# Patient Record
Sex: FEMALE | Race: WHITE | Employment: PART TIME | URBAN - NONMETROPOLITAN AREA
[De-identification: names, ages, dates, MRNs, and addresses within clinical notes are randomized per-mention and may not be internally consistent; named-entity substitution may affect disease eponyms.]

---

## 2018-10-31 ENCOUNTER — OFFICE VISIT (OUTPATIENT)
Dept: FAMILY MEDICINE CLINIC | Age: 33
End: 2018-10-31
Payer: MEDICARE

## 2018-10-31 VITALS
TEMPERATURE: 99.1 F | OXYGEN SATURATION: 97 % | SYSTOLIC BLOOD PRESSURE: 112 MMHG | DIASTOLIC BLOOD PRESSURE: 60 MMHG | WEIGHT: 179 LBS | HEART RATE: 78 BPM | RESPIRATION RATE: 16 BRPM

## 2018-10-31 DIAGNOSIS — J20.9 ACUTE WHEEZY BRONCHITIS: Primary | ICD-10-CM

## 2018-10-31 PROCEDURE — G8484 FLU IMMUNIZE NO ADMIN: HCPCS | Performed by: NURSE PRACTITIONER

## 2018-10-31 PROCEDURE — 99202 OFFICE O/P NEW SF 15 MIN: CPT | Performed by: NURSE PRACTITIONER

## 2018-10-31 PROCEDURE — G8427 DOCREV CUR MEDS BY ELIG CLIN: HCPCS | Performed by: NURSE PRACTITIONER

## 2018-10-31 PROCEDURE — G8421 BMI NOT CALCULATED: HCPCS | Performed by: NURSE PRACTITIONER

## 2018-10-31 PROCEDURE — 1036F TOBACCO NON-USER: CPT | Performed by: NURSE PRACTITIONER

## 2018-10-31 RX ORDER — AZITHROMYCIN 250 MG/1
TABLET, FILM COATED ORAL
Qty: 1 PACKET | Refills: 0 | Status: SHIPPED | OUTPATIENT
Start: 2018-10-31 | End: 2018-11-04

## 2018-10-31 RX ORDER — ALBUTEROL SULFATE 90 UG/1
2 AEROSOL, METERED RESPIRATORY (INHALATION) EVERY 4 HOURS PRN
Qty: 1 INHALER | Refills: 0 | Status: SHIPPED | OUTPATIENT
Start: 2018-10-31 | End: 2020-10-27 | Stop reason: ALTCHOICE

## 2018-10-31 RX ORDER — PREDNISONE 10 MG/1
30 TABLET ORAL DAILY
Qty: 15 TABLET | Refills: 0 | Status: SHIPPED | OUTPATIENT
Start: 2018-10-31 | End: 2018-11-05

## 2018-10-31 ASSESSMENT — ENCOUNTER SYMPTOMS
RHINORRHEA: 1
WHEEZING: 1
SHORTNESS OF BREATH: 0
COUGH: 1
HEMOPTYSIS: 0

## 2018-10-31 NOTE — PROGRESS NOTES
Family History   Problem Relation Age of Onset   [de-[facility] Mother         stillborn x1 term    Heart Disease Father     Cancer Father         stomach    High Blood Pressure Father     Mental Illness Maternal Grandmother     Cancer Paternal Grandmother         stomach, pancreas, breast    High Blood Pressure Paternal Grandmother     Diabetes Paternal Grandmother      Social History   Substance Use Topics    Smoking status: Former Smoker     Years: 5.00    Smokeless tobacco: Never Used    Alcohol use No      Current Outpatient Prescriptions   Medication Sig Dispense Refill    azithromycin (ZITHROMAX Z-MILVIA) 250 MG tablet Take 2 tablets (500 mg) on Day 1, and then take 1 tablet (250 mg) on days 2 through 5. 1 packet 0    predniSONE (DELTASONE) 10 MG tablet Take 3 tablets by mouth daily for 5 days 15 tablet 0    ipratropium (ATROVENT HFA) 17 MCG/ACT inhaler Inhale 1 puff into the lungs 3 times daily 1 Inhaler 3    albuterol sulfate HFA (PROVENTIL HFA) 108 (90 Base) MCG/ACT inhaler Inhale 2 puffs into the lungs every 4 hours as needed for Wheezing or Shortness of Breath (COUGH) 1 Inhaler 0     No current facility-administered medications for this visit. Allergies   Allergen Reactions    Latex          Subjective:      Review of Systems   Constitutional: Positive for fever (101.7). Negative for chills. HENT: Positive for postnasal drip and rhinorrhea. Negative for ear pain. Respiratory: Positive for cough and wheezing. Negative for hemoptysis and shortness of breath. Cardiovascular: Positive for chest pain. Objective:     /60 (Site: Right Upper Arm, Position: Sitting, Cuff Size: Medium Adult)   Pulse 78   Temp 99.1 °F (37.3 °C) (Tympanic)   Resp 16   Wt 179 lb (81.2 kg)   SpO2 97%   Breastfeeding? Unknown     Physical Exam   Constitutional: She is oriented to person, place, and time. Vital signs are normal. She appears well-developed and well-nourished. if:    · You have severe trouble breathing.    Call your doctor now or seek immediate medical care if:    · You have new or worse trouble breathing.     · You cough up dark brown or bloody mucus (sputum).     · You have a new or higher fever.     · You have a new rash.    Watch closely for changes in your health, and be sure to contact your doctor if:    · You cough more deeply or more often, especially if you notice more mucus or a change in the color of your mucus.     · You are not getting better as expected. Where can you learn more? Go to https://[employer]Footbalistic. org and sign in to your [employer] account. Enter H333 in the LightPole box to learn more about \"Bronchitis: Care Instructions. \"     If you do not have an account, please click on the \"Sign Up Now\" link. Current as of: December 6, 2017  Content Version: 11.7  © 6967-3534 [employer]. Care instructions adapted under license by [facility] ([facility]). If you have questions about a medical condition or this instruction, always ask your healthcare professional. [place] [address] any warranty or liability for your use of this information. Discussed use, benefit, and side effects of prescribed medications. All patient, parent, or caregiver questions answered. Patient/parent/caregiver voiced understanding. Reviewed health maintenance. Instructed to continuecurrent medications, diet and exercise. Patient agreed with treatment plan. Follow up as directed.            Electronically signed by FLORIN Lopez CNP on10/31/2018

## 2019-06-27 ENCOUNTER — OFFICE VISIT (OUTPATIENT)
Dept: FAMILY MEDICINE CLINIC | Age: 34
End: 2019-06-27
Payer: MEDICARE

## 2019-06-27 VITALS
HEART RATE: 74 BPM | WEIGHT: 185 LBS | DIASTOLIC BLOOD PRESSURE: 76 MMHG | OXYGEN SATURATION: 99 % | TEMPERATURE: 97.6 F | SYSTOLIC BLOOD PRESSURE: 124 MMHG

## 2019-06-27 DIAGNOSIS — R22.0 SWELLING OF LEFT SIDE OF FACE: Primary | ICD-10-CM

## 2019-06-27 PROCEDURE — G8421 BMI NOT CALCULATED: HCPCS | Performed by: NURSE PRACTITIONER

## 2019-06-27 PROCEDURE — G8427 DOCREV CUR MEDS BY ELIG CLIN: HCPCS | Performed by: NURSE PRACTITIONER

## 2019-06-27 PROCEDURE — 99213 OFFICE O/P EST LOW 20 MIN: CPT | Performed by: NURSE PRACTITIONER

## 2019-06-27 PROCEDURE — 1036F TOBACCO NON-USER: CPT | Performed by: NURSE PRACTITIONER

## 2019-06-27 ASSESSMENT — ENCOUNTER SYMPTOMS
EYE PAIN: 1
VOMITING: 1
SINUS PAIN: 1
NAUSEA: 1
COUGH: 0
FACIAL SWELLING: 1
SINUS PRESSURE: 1

## 2019-06-27 NOTE — PROGRESS NOTES
[facility] [place] [address] [address] [facility], APRN-CNP  [address]  Phone:  [phone]  Fax:  [phone]  Otilio Beatty is a 36 y.o. female who presents today for her medical conditions/complaints as noted below. Otilio Beatty c/o of Facial Pain (swollen painful area on left cheek - started about 3 days ago, continues to get bigger, decreased hearing right ear)      HPI:     Other   This is a new problem. The current episode started in the past 7 days (2 days). The problem occurs constantly. The problem has been rapidly worsening. Associated symptoms include fatigue, headaches, nausea, a rash and vomiting. Pertinent negatives include no chills, coughing or fever. Associated symptoms comments: Facial pain, Pain with extraocular movements of the left eye. Blurred vision of the left eye. . Exacerbated by: moving the left eye. She has tried acetaminophen and NSAIDs for the symptoms. The treatment provided no relief.        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 2013    Unspecified diseases of blood and blood-forming organs       Past Surgical History:   Procedure Laterality Date     SECTION N/A '08,'    HERNIA REPAIR       Family History   Problem Relation Age of Onset    Miscarriages / Stillbirths Mother         stillborn x1 term    Heart Disease Father     Cancer Father         stomach    High Blood Pressure Father     Mental Illness Maternal Grandmother     Cancer Paternal Grandmother         stomach, pancreas, breast    High Blood Pressure Paternal Grandmother     Diabetes Paternal Grandmother      Social History     Tobacco Use    Smoking status: Former Smoker     Years: 5.00    Smokeless tobacco: Never Used   Substance Use Topics    Alcohol use: No      Current Outpatient Medications   Medication Sig Dispense Refill    ipratropium (ATROVENT HFA) 17 MCG/ACT inhaler Inhale 1 puff into the lungs 3 times daily 1 Inhaler 3    albuterol sulfate HFA (PROVENTIL HFA) 108 (90 Base) MCG/ACT inhaler Inhale 2 puffs into the lungs every 4 hours as needed for Wheezing or Shortness of Breath (COUGH) 1 Inhaler 0     No current facility-administered medications for this visit. Allergies   Allergen Reactions    Latex        No exam data present    Subjective:      Review of Systems   Constitutional: Positive for fatigue. Negative for chills and fever. HENT: Positive for dental problem, ear pain, facial swelling, hearing loss, sinus pressure and sinus pain. Eyes: Positive for pain. Respiratory: Negative for cough. Gastrointestinal: Positive for nausea and vomiting. Skin: Positive for rash. Neurological: Positive for headaches. OU 20/50  Right 20/40  Left 20/50  Objective:     /76 (Site: Left Upper Arm, Position: Sitting, Cuff Size: Large Adult)   Pulse 74   Temp 97.6 °F (36.4 °C) (Tympanic)   Wt 185 lb (83.9 kg)   SpO2 99%     Physical Exam   Constitutional: She appears well-developed and well-nourished. HENT:   Head: Normocephalic. Mouth/Throat: Oropharynx is clear and moist. Dental abscesses present. No tonsillar exudate. Eyes: Right eye exhibits no discharge. Left eye exhibits no discharge. Pain with extraocular movement. Neck: Normal range of motion. Neck supple. Pulmonary/Chest: Effort normal.   Lymphadenopathy:     She has no cervical adenopathy. Skin: Skin is warm and dry.

## 2019-06-27 NOTE — PATIENT INSTRUCTIONS
Go to the hospital for further evaluation and treatment. May require CT and IV antibiotics with drainage. Patient Education        Headache: Care Instructions  Your Care Instructions    Headaches have many possible causes. Most headaches aren't a sign of a more serious problem, and they will get better on their own. Home treatment may help you feel better faster. The doctor has checked you carefully, but problems can develop later. If you notice any problems or new symptoms, get medical treatment right away. Follow-up care is a key part of your treatment and safety. Be sure to make and go to all appointments, and call your doctor if you are having problems. It's also a good idea to know your test results and keep a list of the medicines you take. How can you care for yourself at home? · Do not drive if you have taken a prescription pain medicine. · Rest in a quiet, dark room until your headache is gone. Close your eyes and try to relax or go to sleep. Don't watch TV or read. · Put a cold, moist cloth or cold pack on the painful area for 10 to 20 minutes at a time. Put a thin cloth between the cold pack and your skin. · Use a warm, moist towel or a heating pad set on low to relax tight shoulder and neck muscles. · Have someone gently massage your neck and shoulders. · Take pain medicines exactly as directed. ? If the doctor gave you a prescription medicine for pain, take it as prescribed. ? If you are not taking a prescription pain medicine, ask your doctor if you can take an over-the-counter medicine. · Be careful not to take pain medicine more often than the instructions allow, because you may get worse or more frequent headaches when the medicine wears off. · Do not ignore new symptoms that occur with a headache, such as a fever, weakness or numbness, vision changes, or confusion. These may be signs of a more serious problem.   To prevent headaches  · Keep a headache diary so you can figure out what triggers your headaches. Avoiding triggers may help you prevent headaches. Record when each headache began, how long it lasted, and what the pain was like (throbbing, aching, stabbing, or dull). Write down any other symptoms you had with the headache, such as nausea, flashing lights or dark spots, or sensitivity to bright light or loud noise. Note if the headache occurred near your period. List anything that might have triggered the headache, such as certain foods (chocolate, cheese, wine) or odors, smoke, bright light, stress, or lack of sleep. · Find healthy ways to deal with stress. Headaches are most common during or right after stressful times. Take time to relax before and after you do something that has caused a headache in the past.  · Try to keep your muscles relaxed by keeping good posture. Check your jaw, face, neck, and shoulder muscles for tension, and try relaxing them. When sitting at a desk, change positions often, and stretch for 30 seconds each hour. · Get plenty of sleep and exercise. · Eat regularly and well. Long periods without food can trigger a headache. · Treat yourself to a massage. Some people find that regular massages are very helpful in relieving tension. · Limit caffeine by not drinking too much coffee, tea, or soda. But don't quit caffeine suddenly, because that can also give you headaches. · Reduce eyestrain from computers by blinking frequently and looking away from the computer screen every so often. Make sure you have proper eyewear and that your monitor is set up properly, about an arm's length away. · Seek help if you have depression or anxiety. Your headaches may be linked to these conditions. Treatment can both prevent headaches and help with symptoms of anxiety or depression. When should you call for help? Call 911 anytime you think you may need emergency care. For example, call if:    · You have signs of a stroke.  These may include:  ? Sudden numbness,

## 2020-01-21 ENCOUNTER — HOSPITAL ENCOUNTER (OUTPATIENT)
Age: 35
Setting detail: SPECIMEN
Discharge: HOME | End: 2020-01-21
Payer: MEDICARE

## 2020-01-28 LAB — SURGICAL PATHOLOGY REPORT: NORMAL

## 2020-10-27 ENCOUNTER — VIRTUAL VISIT (OUTPATIENT)
Dept: FAMILY MEDICINE CLINIC | Age: 35
End: 2020-10-27
Payer: MEDICARE

## 2020-10-27 PROCEDURE — G8431 POS CLIN DEPRES SCRN F/U DOC: HCPCS | Performed by: INTERNAL MEDICINE

## 2020-10-27 PROCEDURE — 99214 OFFICE O/P EST MOD 30 MIN: CPT | Performed by: INTERNAL MEDICINE

## 2020-10-27 ASSESSMENT — PATIENT HEALTH QUESTIONNAIRE - PHQ9
3. TROUBLE FALLING OR STAYING ASLEEP: 3
4. FEELING TIRED OR HAVING LITTLE ENERGY: 3
8. MOVING OR SPEAKING SO SLOWLY THAT OTHER PEOPLE COULD HAVE NOTICED. OR THE OPPOSITE, BEING SO FIGETY OR RESTLESS THAT YOU HAVE BEEN MOVING AROUND A LOT MORE THAN USUAL: 0
SUM OF ALL RESPONSES TO PHQ QUESTIONS 1-9: 19
7. TROUBLE CONCENTRATING ON THINGS, SUCH AS READING THE NEWSPAPER OR WATCHING TELEVISION: 3
6. FEELING BAD ABOUT YOURSELF - OR THAT YOU ARE A FAILURE OR HAVE LET YOURSELF OR YOUR FAMILY DOWN: 1
SUM OF ALL RESPONSES TO PHQ QUESTIONS 1-9: 19
SUM OF ALL RESPONSES TO PHQ9 QUESTIONS 1 & 2: 6
10. IF YOU CHECKED OFF ANY PROBLEMS, HOW DIFFICULT HAVE THESE PROBLEMS MADE IT FOR YOU TO DO YOUR WORK, TAKE CARE OF THINGS AT HOME, OR GET ALONG WITH OTHER PEOPLE: 2
9. THOUGHTS THAT YOU WOULD BE BETTER OFF DEAD, OR OF HURTING YOURSELF: 0
2. FEELING DOWN, DEPRESSED OR HOPELESS: 3
SUM OF ALL RESPONSES TO PHQ QUESTIONS 1-9: 19
1. LITTLE INTEREST OR PLEASURE IN DOING THINGS: 3
5. POOR APPETITE OR OVEREATING: 3

## 2020-10-27 NOTE — PROGRESS NOTES
[address]  [address]  Dept: [phone]  Dept Fax: [phone]: [phone]     Visit Date:  10/27/2020    Patient:  Fartun Terrazas  YOB: 1985    HPI:   Fartun Terrazas presents today for   Chief Complaint   Patient presents with   [employer] Established New Doctor    Weight Loss     patient has lost around 50 lbs in the last month. patient is having an issue with not being able to eat, she is getting nauseous.  Depression     patient would like to discuss depression issues. .    AUDIO/VIDEO CALL DUE TO COVID 23   HPI 64-year-old female is calling regarding unintentional weight loss in the past 1 month with dyspepsia-like symptoms. Patient reports in the past 3 to 4 weeks she has lost more than 50 pounds and currently weighs 142 pounds. She has decreased appetite the only thing she is able to keep down his water but milk pop soda is anything makes her stomach symptoms worsen she feels like puking all over. Last year she reports she had a diagnosis of H. pylori and did receive the treatment but there was no follow-up test that was done for eradication testing. SHe works in a nursing home and last time she was also diagnosed with C. difficile. work-up last year with endoscopy and colonoscopy and that is when they discovered H. pylori and she reports partially taking the treatments for less than 14 days as those antibiotics were making her sicker. She reports she was never tested again for eradication of H. pylori. She was also scheduled for a swallow study/possible gastroparesis study or barium swallow around February March that got canceled because of COVID-19 related pandemic. Report having a family history where her dad got diagnosed at a very young age of stomach cancer.   Also does marijuana on intermittent basis 3 times per week as that is the only thing that has been helping her to keep food down and improve appetite. Does report having belching bloating burping and feeling excessively hot and cold sometimes with a sensation of as if she swallowed a mint. She has never had a normal normal bowel movements in September it is always more watery not firm more creamy. Feels like her stomach is upset and she is starving to death. She does report having periodic bloody stools and black stools and does report having history of hemorrhoids. She denies overuse of NSAIDs. RePorts having night sweats almost feels like she is having hot flashes as if she is going through menopause but denies any skipping of her periods. Denies Any lumps or bumps around her body. Having history of colonic polyps that was removed. Feel lightheaded sometimes. From the depression screen and she scored high close to 19 not on meds and not homicidal or suicidal. Smoking history 1 ppd lasting x 1 week since 19 years and no alcohol. Medications  Prior to Visit Medications    Not on File        Allergies:  is allergic to latex and iodides. Past Medical History:   has a past medical history of History of  section, MTHFR deficiency complicating pregnancy ([facility] [address] Pregnancy complicated by previous recurrent miscarriages, and Unspecified diseases of blood and blood-forming organs. Past Surgical History   has a past surgical history that includes hernia repair and  section (N/A, ,). Family History  family history includes Cancer in her father and paternal grandmother; Diabetes in her paternal grandmother; Heart Disease in her father; High Blood Pressure in her father and paternal grandmother; Mental Illness in her maternal grandmother; [place] / [place] in her mother. Social History   reports that she has quit smoking. She quit after 5.00 years of use.  She has never used smokeless tobacco. She reports that she does not drink alcohol or use drugs.    Health Maintenance:    Health Maintenance   Topic Date Due    Varicella vaccine (1 of 2 - 2-dose childhood series) 01/09/1987    HIV screen  01/09/2001    DTaP/Tdap/Td vaccine (1 - Tdap) 01/09/2005    Cervical cancer screen  01/09/2007    Flu vaccine (1) 09/01/2020    Hepatitis A vaccine  Aged Out    Hepatitis B vaccine  Aged Out    Hib vaccine  Aged Out    Meningococcal (ACWY) vaccine  Aged Out    Pneumococcal 0-64 years Vaccine  Aged Out       Subjective:      Review of Systems   Constitutional: Positive for fatigue and unexpected weight change. Negative for diaphoresis and fever. Lost>50 lbs within a month. HENT: Negative for ear pain, postnasal drip, rhinorrhea, sinus pain, sore throat and trouble swallowing. Eyes: Negative for visual disturbance. Respiratory: Negative for cough, chest tightness and shortness of breath. Cardiovascular: Negative for chest pain and leg swelling. Gastrointestinal: Positive for abdominal pain, diarrhea, nausea and vomiting. Negative for abdominal distention, anal bleeding, blood in stool, constipation and rectal pain. Endocrine: Negative for polyuria. Genitourinary: Negative for difficulty urinating and flank pain. Musculoskeletal: Negative for arthralgias, joint swelling and myalgias. Skin: Negative for rash. Allergic/Immunologic: Negative for environmental allergies. Neurological: Negative for weakness, light-headedness, numbness and headaches. Hematological: Negative for adenopathy. Psychiatric/Behavioral: Negative for behavioral problems and suicidal ideas. The patient is not nervous/anxious. Objective: There were no vitals taken for this visit. Physical Exam        Assessment       Diagnosis Orders   1. History of Clostridium difficile colitis  H. Pylori Breath Test    Dinora Fermin DO, General Surgery, [place]   2.  Helicobacter pylori (H. pylori) infection  H. Pylori Breath Test    [facility], DO, General Surgery, [place]   3. Dyspepsia  H. Pylori Breath Test    [facility], DO, General Surgery, [place]   4. Unintended weight loss  H. Pylori Breath Test    [facility], DO, General Surgery, [place]   5. Nausea and vomiting, intractability of vomiting not specified, unspecified vomiting type  H. Pylori Breath Test    [facility], DO, General Surgery, [place]   6. Marijuana user  [facility] [place], DO, General Surgery, [place]   7. Hemorrhoids, unspecified hemorrhoid type  H. Pylori Breath Test    [facility], DO, General Surgery, [place]   8. Decreased appetite  H. Pylori Breath Test    [facility], DO, General Surgery, [place]   9. Depression, unspecified depression type  H. Pylori Breath Test    [facility], DO, General Surgery, [place]   10. Smoking  H. Pylori Breath Test    [facility], DO, General Surgery, [place]   11. Positive depression screening  Positive Screen for Clinical Depression with a Documented Follow-up Plan          PLAN       Minimize Use of marijuana and smoking as much as possible. patient is not interested in any medicines for depression as of yet. Use of Tums Pepto-Bismol as well as avoiding any NSAIDs and plenty of water Gatorade Pedialyte to keep yourself hydrated would be the key here. Hold off on PPI for now,  order H. pylori urea breath test and also referral for endoscopy from general surgery to rule out any form of H. pylori/H. pylori related complications such as gastric cancer. Taylor Cottrell is a 30 y.o. female being evaluated by a Virtual Visit (video visit) encounter to address concerns as mentioned above. A caregiver was present when appropriate. Due to this being a TeleHealth encounter (During [place] [address] public health emergency), evaluation of the following organ systems was limited: Vitals/Constitutional/EENT/Resp/CV/GI//MS/Neuro/Skin/Heme-Lymph-Imm.   Pursuant to the emergency declaration under the [address], [address] [place] [address] authority and the [employer] Act, this Virtual Visit was conducted with patient's (and/or legal guardian's) consent, to reduce the patient's risk of exposure to COVID-19 and provide necessary medical care. The patient (and/or legal guardian) has also been advised to contact this office for worsening conditions or problems, and seek emergency medical treatment and/or call 911 if deemed necessary. Patient identification was verified at the start of the visit: Yes    Total time spent for this encounter: 30 mins    Services were provided through a video synchronous discussion virtually to substitute for in-person clinic visit. Patient and provider were located at their individual homes. --Star Ayala MD on 11/3/2020 at 12:01 PM    An electronic signature was used to authenticate this note. Orders Placed This Encounter   Procedures    H. Pylori Breath Test     History of H. Pylori in the past.     Standing Status:   Future     Standing Expiration Date:   10/27/2021   Jessica Roberts DO, General Surgery, [place]     Referral Priority:   Routine     Referral Type:   Eval and Treat     Referral Reason:   Specialty Services Required     Referred to Provider:   Paris Macdonald DO     Requested Specialty:   General Surgery     Number of Visits Requested:   1    Positive Screen for Clinical Depression with a Documented Follow-up Plan      No follow-ups on file. Patient given educational materials - see patient instructions. Discussed use, benefit, and side effects of prescribed medications. All patient questions answered. Pt voiced understanding. Reviewed health maintenance.        Electronically signed Sasha Delgado MD on 10/27/2020 at 1:19 PM EDT

## 2020-10-30 ASSESSMENT — ENCOUNTER SYMPTOMS
BLOOD IN STOOL: 0
RHINORRHEA: 0
CONSTIPATION: 0
ABDOMINAL DISTENTION: 0
ABDOMINAL PAIN: 1
RECTAL PAIN: 0
SHORTNESS OF BREATH: 0
CHEST TIGHTNESS: 0
COUGH: 0
TROUBLE SWALLOWING: 0
VOMITING: 1
NAUSEA: 1
SINUS PAIN: 0
ANAL BLEEDING: 0
SORE THROAT: 0
DIARRHEA: 1

## 2020-11-12 ENCOUNTER — TELEPHONE (OUTPATIENT)
Dept: SURGERY | Age: 35
End: 2020-11-12

## 2020-11-12 NOTE — TELEPHONE ENCOUNTER
Patient was referred to General Surgery from PCP Dr. Tye Marie. Patient is requesting to be scheduled here at the [facility]. Dr. Cesar Collado notified of patient's symptoms. Patient states she has lost 50 pounds in a matter of a month, nausea, vomiting, constipation/diarrhea, as well as a previous history of Positive H.Pylor. Dr. Cesar Collado request to have patient scheduled tomorrow in clinic. Dr. Cesar Collado ordered CMP and CBC for patient at this time.

## 2020-11-13 ENCOUNTER — OFFICE VISIT (OUTPATIENT)
Dept: SURGERY | Age: 35
End: 2020-11-13
Payer: MEDICARE

## 2020-11-13 VITALS
DIASTOLIC BLOOD PRESSURE: 70 MMHG | HEART RATE: 66 BPM | TEMPERATURE: 98.5 F | HEIGHT: 68 IN | WEIGHT: 151.4 LBS | BODY MASS INDEX: 22.94 KG/M2 | SYSTOLIC BLOOD PRESSURE: 121 MMHG

## 2020-11-13 LAB
ALBUMIN/GLOBULIN RATIO: 1.5 G/DL
ALBUMIN: 4.1 G/DL (ref 3.5–5)
ALP BLD-CCNC: 73 UNITS/L (ref 38–126)
ALT SERPL-CCNC: 9 UNITS/L (ref 4–35)
ANION GAP SERPL CALCULATED.3IONS-SCNC: 10 MMOL/L
AST SERPL-CCNC: 20 UNITS/L (ref 14–36)
BILIRUB SERPL-MCNC: 0.6 MG/DL (ref 0.2–1.3)
BUN BLDV-MCNC: 12 MG/DL (ref 7–17)
CALCIUM SERPL-MCNC: 9.2 MG/DL (ref 8.4–10.2)
CHLORIDE BLD-SCNC: 103 MMOL/L (ref 98–120)
CO2: 27 MMOL/L (ref 22–31)
CREAT SERPL-MCNC: 0.7 MG/DL (ref 0.5–1)
GFR CALCULATED: > 60
GLOBULIN: 2.8 G/DL
GLUCOSE: 105 MG/DL (ref 65–105)
HCT VFR BLD CALC: 49 % (ref 37–47)
HEMOGLOBIN: 15.8 (ref 12–16)
MCH RBC QN AUTO: 28.6 PG (ref 28.5–32.5)
MCHC RBC AUTO-ENTMCNC: 32.2 G/DL (ref 32–37)
MCV RBC AUTO: 88.7 FL (ref 80–94)
PDW BLD-RTO: 12.1 % (ref 8.5–15.5)
PLATELET # BLD: 216.4 THOU/MM3 (ref 130–400)
POTASSIUM SERPL-SCNC: 4.3 MMOL/L (ref 3.6–5)
RBC: 5.52 M/UL (ref 4.2–5.4)
SODIUM BLD-SCNC: 139 MMOL/L (ref 135–145)
TOTAL PROTEIN, SERUM: 7 G/DL (ref 6.3–8.2)
WBC: 8.9 THOU/ML3 (ref 4.8–10.8)

## 2020-11-13 PROCEDURE — G8484 FLU IMMUNIZE NO ADMIN: HCPCS | Performed by: SURGERY

## 2020-11-13 PROCEDURE — G8420 CALC BMI NORM PARAMETERS: HCPCS | Performed by: SURGERY

## 2020-11-13 PROCEDURE — 4004F PT TOBACCO SCREEN RCVD TLK: CPT | Performed by: SURGERY

## 2020-11-13 PROCEDURE — 99204 OFFICE O/P NEW MOD 45 MIN: CPT | Performed by: SURGERY

## 2020-11-13 PROCEDURE — G8427 DOCREV CUR MEDS BY ELIG CLIN: HCPCS | Performed by: SURGERY

## 2020-11-13 RX ORDER — BISACODYL 5 MG
TABLET, DELAYED RELEASE (ENTERIC COATED) ORAL
Qty: 2 TABLET | Refills: 0 | Status: SHIPPED | OUTPATIENT
Start: 2020-11-13 | End: 2020-12-03

## 2020-11-13 RX ORDER — OMEPRAZOLE 40 MG/1
40 CAPSULE, DELAYED RELEASE ORAL
Qty: 30 CAPSULE | Refills: 5 | Status: SHIPPED | OUTPATIENT
Start: 2020-11-13 | End: 2020-12-03

## 2020-11-13 ASSESSMENT — ENCOUNTER SYMPTOMS
ANAL BLEEDING: 1
NAUSEA: 1
EYES NEGATIVE: 1
DIARRHEA: 1
VOICE CHANGE: 1
BLOOD IN STOOL: 1
SHORTNESS OF BREATH: 0
WHEEZING: 0
CHEST TIGHTNESS: 0
CONSTIPATION: 1
ABDOMINAL DISTENTION: 1
SORE THROAT: 0
TROUBLE SWALLOWING: 0
CHOKING: 0
VOMITING: 1
COUGH: 0
BACK PAIN: 0
ABDOMINAL PAIN: 1

## 2020-11-13 ASSESSMENT — CROHNS DISEASE ACTIVITY INDEX (CDAI): CDAI SCORE: 0

## 2020-11-13 NOTE — PROGRESS NOTES
Subjective:      Patient ID: Kaye Singh is a [address] y.o. female. Diarrhea    This is a chronic problem. The current episode started more than 1 year ago. The problem occurs less than 2 times per day. Diarrhea characteristics: very soft. The patient states that diarrhea awakens her from sleep. Associated symptoms include abdominal pain, arthralgias, headaches, vomiting and weight loss ( 50 lbs). Pertinent negatives include no coughing, fever or myalgias. Risk factors: had c. diff. about a year ago. She has tried nothing for the symptoms. There is no history of bowel resection, inflammatory bowel disease, irritable bowel syndrome, malabsorption, a recent abdominal surgery or short gut syndrome. Nausea & Vomiting   Associated symptoms include abdominal pain, anorexia, arthralgias, headaches, nausea and vomiting. Pertinent negatives include no chest pain, coughing, fever, joint swelling, myalgias, neck pain, rash, sore throat or weakness. Abdominal Pain   This is a chronic problem. The current episode started more than 1 year ago. The problem occurs constantly. The problem has been gradually worsening. The pain is located in the epigastric region. The pain is at a severity of 7/10. The quality of the pain is aching. Associated symptoms include anorexia, arthralgias, constipation, diarrhea, headaches, nausea, vomiting and weight loss ( 50 lbs). Pertinent negatives include no dysuria, fever or myalgias. The pain is aggravated by eating. The pain is relieved by recumbency. Treatments tried: Not currently on anything. Prior diagnostic workup includes upper endoscopy. Her past medical history is significant for abdominal surgery. There is no history of Crohn's disease, gallstones, GERD, irritable bowel syndrome, pancreatitis, PUD or ulcerative colitis.      Past Medical History:   Diagnosis Date    History of  section 2013    [facility]    homozygous    Pregnancy complicated by previous recurrent miscarriages 2013    Unspecified diseases of blood and blood-forming organs      Past Surgical History:   Procedure Laterality Date     SECTION N/A ,    HERNIA REPAIR       No current outpatient medications on file. No current facility-administered medications for this visit. Allergies   Allergen Reactions    Latex     Iodides      Social History     Tobacco Use    Smoking status: Light Tobacco Smoker     Years: 5.00     Types: Cigarettes    Smokeless tobacco: Never Used   Substance Use Topics    Alcohol use: No    Drug use: Yes     Types: Marijuana     Comment: Occasional to try and stop her nausea and eat. Family History   Problem Relation Age of Onset   [de-identified] / [place] Mother         stillborn x1 term    Heart Disease Father     Cancer Father         stomach    High Blood Pressure Father     Mental Illness Maternal Grandmother     Cancer Paternal Grandmother         stomach, pancreas, breast    High Blood Pressure Paternal Grandmother     Diabetes Paternal Grandmother      Review of Systems   Constitutional: Positive for unexpected weight change (   weight loss over past month perhaps 50 lbs) and weight loss ( 50 lbs). Negative for fever. HENT: Positive for voice change. Negative for dental problem, drooling, sore throat and trouble swallowing. Eyes: Negative. Respiratory: Negative for cough, choking, chest tightness, shortness of breath and wheezing. Cardiovascular: Negative for chest pain. Gastrointestinal: Positive for abdominal distention, abdominal pain, anal bleeding, anorexia, blood in stool, constipation, diarrhea, nausea and vomiting. Complains of prolapsing hemorrhoids requiring reduction. Endocrine: Positive for polydipsia. Negative for polyphagia and polyuria. Genitourinary: Positive for menstrual problem ( chronic heavy periods).  Negative for difficulty urinating, dysuria, pelvic pain, vaginal bleeding, vaginal discharge and vaginal pain. Musculoskeletal: Positive for arthralgias. Negative for back pain, joint swelling, myalgias, neck pain and neck stiffness. Skin: Negative for rash and wound. Neurological: Positive for dizziness, syncope, light-headedness and headaches. Negative for tremors, seizures, speech difficulty and weakness. Hematological: Bruises/bleeds easily. Psychiatric/Behavioral: Negative for agitation, behavioral problems, confusion, decreased concentration and dysphoric mood. /70 (Site: Left Upper Arm, Position: Sitting, Cuff Size: Medium Adult)   Pulse 66   Temp 98.5 °F (36.9 °C) (Temporal)   Ht 5' 8\" (1.727 m)   Wt 151 lb 6.4 oz (68.7 kg)   LMP 10/30/2020 (Within Days)   Breastfeeding No   BMI 23.02 kg/m²     Objective:   Physical Exam  Constitutional:       General: She is not in acute distress. Appearance: Normal appearance. She is normal weight. She is not ill-appearing, toxic-appearing or diaphoretic. HENT:      Head: Normocephalic and atraumatic. Mouth/Throat:      Mouth: Mucous membranes are moist.      Pharynx: Oropharynx is clear. No oropharyngeal exudate. Eyes:      General: No scleral icterus. Extraocular Movements: Extraocular movements intact. Conjunctiva/sclera: Conjunctivae normal.      Pupils: Pupils are equal, round, and reactive to light. Neck:      Musculoskeletal: Normal range of motion and neck supple. No neck rigidity or muscular tenderness. Cardiovascular:      Rate and Rhythm: Normal rate and regular rhythm. Pulses: Normal pulses. Heart sounds: Normal heart sounds. Pulmonary:      Effort: Pulmonary effort is normal. No respiratory distress. Breath sounds: Normal breath sounds. No wheezing. Abdominal:      General: Abdomen is flat. Bowel sounds are normal. There is no distension. Palpations: Abdomen is soft. There is no hepatomegaly, splenomegaly or mass.       Tenderness: There is no abdominal tenderness. There is no guarding or rebound. Hernia: No hernia is present. There is no hernia in the umbilical area, ventral area, left inguinal area, right femoral area, left femoral area or right inguinal area. Musculoskeletal: Normal range of motion. General: No swelling or tenderness. Lymphadenopathy:      Head:      Right side of head: No submental, submandibular, tonsillar, preauricular, posterior auricular or occipital adenopathy. Left side of head: No submental, submandibular, tonsillar, preauricular, posterior auricular or occipital adenopathy. Cervical:      Right cervical: No superficial, deep or posterior cervical adenopathy. Left cervical: No superficial, deep or posterior cervical adenopathy. Upper Body:      Right upper body: No supraclavicular, axillary, pectoral or epitrochlear adenopathy. Left upper body: No supraclavicular, axillary, pectoral or epitrochlear adenopathy. Lower Body: No right inguinal adenopathy. No left inguinal adenopathy. Skin:     General: Skin is warm and dry. Nails: There is no clubbing. Neurological:      General: No focal deficit present. Mental Status: She is alert and oriented to person, place, and time. Psychiatric:         Mood and Affect: Mood normal.         Behavior: Behavior normal.         Thought Content: Thought content normal.         Judgment: Judgment normal.       CBC today was normal  CMP is completely normal  Assessment:      1) Epigastric pain with nausea and vomiting and weight loss  - This is unlikely to be functional with weight loss  - Consider ulcers, gastritis, neoplasm  - Consider celiac disease (althought her diarrhea is pretty minimal?  - consider chronic pancreatitis  2) Diarrhea - this is a chronic problem. She has a hist of c. Diff. But with only 1 bowel movement a day it is very unlikely she has c. Diff at this time.   Given the persistent nature of her symptoms it is reasonable to evaluate her. Plan:      1) She could wait for her h. Pylori breath test, and treat the h. Pylori if it is positive. Alternately or in addition we could proceed with and endoscopic evaluation  2) EGD and Colonoscopy - Risks and benefits of colonoscopy and EGD (upper G.I. Endoscopy) were discussed with [place]. In particular I discussed the nature and limitations of the procedures, the possibility of incomplete colonoscopy and failure to make a diagnosis with either procedure. I also discussed the risks and consequences of reactions to the sedatives, bleeding and perforation. Alternate ways of evaluating the colon including barium enema, CT colonography and sigmoidoscopy were discussed, and alternate ways of evaluating the upper g.i tract were also discussed including barium swallow and CT scan were discussed. she  was also given the opportunity to have any questions answered, and encouraged to call the office with additional issues. 3) If the EGD and colonoscopy are negative, she would probably benefit from a CT scan to look at her pancrease and small bowel.   4) She has Zofran  5) Empirically going to start a PPI, omeprazole, to see if that helps          Sesar Walls MD

## 2020-11-13 NOTE — PROGRESS NOTES
Patient states she has a history of C. Diff one year ago. Patient states she did a round of antibiotics. States she never did a repeat C-Diff test. Patient also states that she was diagnosed with H. Pylori from an endoscope she had completed at [facility] on [address] in [facility]. States she was unable to take the antibiotics that were given to her at that time as they were making her sick and was unable to complete her therapy. No repeat therapy was completed at that time either for the H. Pylori. She has had a 50+ pounds weight, nausea, vomiting, diarrhea/constipation. She also states being unable to eat anything as she takes a couple bites and feels full.

## 2020-11-17 ENCOUNTER — TELEPHONE (OUTPATIENT)
Dept: SURGERY | Age: 35
End: 2020-11-17

## 2020-11-17 NOTE — TELEPHONE ENCOUNTER
Yuval Jeong from [facility] called office to check if Dr. Era Lazar had reviewed the bowel prep change for patient's colonoscopy. Please call her back at [phone].

## 2020-11-20 LAB
HCG URINE: NEGATIVE
SARS-COV-2, RAPID: NEGATIVE

## 2020-11-25 ENCOUNTER — TELEPHONE (OUTPATIENT)
Dept: SURGERY | Age: 35
End: 2020-11-25

## 2020-11-25 LAB — SURGICAL PATHOLOGY REPORT: NORMAL

## 2020-11-25 RX ORDER — AMOXICILLIN 500 MG/1
1000 CAPSULE ORAL 2 TIMES DAILY
Qty: 56 CAPSULE | Refills: 0 | Status: SHIPPED | OUTPATIENT
Start: 2020-11-25 | End: 2020-12-03

## 2020-11-25 RX ORDER — CLARITHROMYCIN 500 MG/1
500 TABLET, COATED ORAL 2 TIMES DAILY
Qty: 28 TABLET | Refills: 0 | Status: SHIPPED | OUTPATIENT
Start: 2020-11-25 | End: 2020-12-03

## 2020-11-25 NOTE — TELEPHONE ENCOUNTER
----- Message from Gurwinder Simon MD sent at 11/25/2020  2:55 PM EST -----  Okay. Looks like she has H. Pylori gastritis. No evidence of colitis or celiac disease. .  Will start therapy for h. Pyloric.   Follow up with me in about 3 weeks

## 2020-11-27 ENCOUNTER — VIRTUAL VISIT (OUTPATIENT)
Dept: FAMILY MEDICINE CLINIC | Age: 35
End: 2020-11-27
Payer: MEDICARE

## 2020-11-27 PROCEDURE — 99212 OFFICE O/P EST SF 10 MIN: CPT

## 2020-11-27 NOTE — PROGRESS NOTES
like COPD which can cause reactive polycythemia. Dehydration could explain some these labs abnormalities and has been a problem for long time. Having hard time keeping food down/liquid down. Family history of cancer-several cancers but she is unsure. First son during pregnancy -took aspirin once day  2nd son- during pregnancy Aspirin twice/thrice a day and Heparin. Left iliac vein - s/p stents put in because of clots. Results for Diane Sullivan (MRN [id number]) as of 11/27/2020 09:49   Ref. Range 11/13/2020 08:38   WBC Latest Ref Range: 4.8 - 10.8 Thou/mL3 8.9   RBC Latest Ref Range: 4.20 - 5.40 M/uL 5.52 (H)   Hemoglobin Quant Latest Ref Range: 12.0 - 16.0  15.8   Hematocrit Latest Ref Range: 37.0 - 47.0 % 49.0 (H)   MCV Latest Ref Range: 80.0 - 94.0 fl 88.7   MCH Latest Ref Range: 28.5 - 32.5 pg 28.6   MCHC Latest Ref Range: 32.0 - 37.0 g/dL 32.2   RDW Latest Ref Range: 8.5 - 15.5 % 12.1   Platelet Count Latest Ref Range: 130 - 400 Thou/mm3 216.4         Medications  Prior to Visit Medications    Medication Sig Taking? Authorizing Provider   clarithromycin (BIAXIN) 500 MG tablet Take 1 tablet by mouth 2 times daily for 14 days  Patient not taking: Reported on 11/27/2020  Christa Herman MD   amoxicillin (AMOXIL) 500 MG capsule Take 2 capsules by mouth 2 times daily for 14 days  Patient not taking: Reported on 11/27/2020  Christa Herman MD   omeprazole (PRILOSEC) 40 MG delayed release capsule Take 1 capsule by mouth every morning (before breakfast)  Patient not taking: Reported on 11/27/2020  Crhista Herman MD   polyethylene glycol (GOLYTELY) 236 g solution Take per instructions supplied by office  Patient not taking: Reported on 11/27/2020  Christa Herman MD   bisacodyl (BISACODYL) 5 MG EC tablet Take with bowel prep  Patient not taking: Reported on 11/27/2020  Christa Herman MD        Allergies:  is allergic to latex and iodides.      Past Medical History:   has a past medical history of History of  section, MTHFR deficiency complicating pregnancy ([facility] Pregnancy complicated by previous recurrent miscarriages, and Unspecified diseases of blood and blood-forming organs. Past Surgical History   has a past surgical history that includes hernia repair;  section (N/A, ,); and IR ANGIOPLASTY ILIAC INIT VESSEL W STENT (2018?). Family History  family history includes Cancer in her father and paternal grandmother; Diabetes in her paternal grandmother; Heart Disease in her father; High Blood Pressure in her father and paternal grandmother; Mental Illness in her maternal grandmother; Francisco Ferreira / [place] in her mother. Social History   reports that she has been smoking cigarettes. She has smoked for the past 5.00 years. She has never used smokeless tobacco. She reports current drug use. Drug: Marijuana. She reports that she does not drink alcohol. Health Maintenance:    Health Maintenance   Topic Date Due    Varicella vaccine (1 of 2 - 2-dose childhood series) 1987    Pneumococcal 0-64 years Vaccine (1 of 1 - PPSV23) 1992    HIV screen  2001    DTaP/Tdap/Td vaccine (1 - Tdap) 2005    Cervical cancer screen  2007    Flu vaccine (1) 2020    Hepatitis A vaccine  Aged Out    Hepatitis B vaccine  Aged Out    Hib vaccine  Aged Out    Meningococcal (ACWY) vaccine  Aged Out       Subjective:      Review of Systems   Constitutional: Negative for fatigue, fever and unexpected weight change. HENT: Negative for ear pain, postnasal drip, rhinorrhea, sinus pain, sore throat and trouble swallowing. Eyes: Negative for visual disturbance. Respiratory: Negative for cough, chest tightness and shortness of breath. Cardiovascular: Negative for chest pain and leg swelling. Gastrointestinal: Positive for diarrhea, nausea and vomiting. Negative for abdominal pain and blood in stool. Endocrine: Negative for polyuria.    Genitourinary: Negative for difficulty urinating and flank pain. Musculoskeletal: Negative for arthralgias, joint swelling and myalgias. Skin: Negative for rash. Allergic/Immunologic: Negative for environmental allergies. Neurological: Negative for weakness, light-headedness, numbness and headaches. Hematological: Negative for adenopathy. Psychiatric/Behavioral: Negative for behavioral problems and suicidal ideas. The patient is not nervous/anxious. Objective:     LMP 10/30/2020 (Within Days)     Physical Exam        Assessment       Diagnosis Orders   1. Methylenetetrahydrofolate reductase (MTHFR) deficiency affecting pregnancy, antepartum (HCC)  CBC Auto Differential    Erythropoietin    Jak2 Gene Mutation, Quantitative    Path Review, Smear    Reticulocytes   2. Previous recurrent miscarriages affecting pregnancy, antepartum  CBC Auto Differential    Erythropoietin    Jak2 Gene Mutation, Quantitative    Path Review, Smear    Reticulocytes   3. Elevated hematocrit  CBC Auto Differential    Erythropoietin    Jak2 Gene Mutation, Quantitative    Path Review, Smear    Reticulocytes   4. Elevated red blood cell count  CBC Auto Differential    Erythropoietin    Jak2 Gene Mutation, Quantitative    Path Review, Smear    Reticulocytes   5. Stenosis of left iliac vein           PLAN   contact surgery's office regarding H. pylori testing results of pathology. EPO levels,JAK2 mutation, reticulocyte count pathology review smear for elevated hemoglobin hematocrit to rule out any form of primary polycythemia. Chinedu Jerez is a [address] y.o. female being evaluated by a Virtual Visit (video visit) encounter to address concerns as mentioned above. A caregiver was present when appropriate. Due to this being a TeleHealth encounter (During [facility] public health emergency), evaluation of the following organ systems was limited: Vitals/Constitutional/EENT/Resp/CV/GI//MS/Neuro/Skin/Heme-Lymph-Imm.   Pursuant to the emergency declaration under the [facility], [address] [place] [address] authority and the [employer] Act, this Virtual Visit was conducted with patient's (and/or legal guardian's) consent, to reduce the patient's risk of exposure to COVID-19 and provide necessary medical care. The patient (and/or legal guardian) has also been advised to contact this office for worsening conditions or problems, and seek emergency medical treatment and/or call 911 if deemed necessary. Patient identification was verified at the start of the visit: Yes    Total time spent for this encounter: Zeeshan Mina were provided through a video synchronous discussion virtually to substitute for in-person clinic visit. Patient and provider were located at their individual homes. --Arianne Delgado MD on 12/1/2020 at 11:29 AM    An electronic signature was used to authenticate this note. Orders Placed This Encounter   Procedures    CBC Auto Differential     Standing Status:   Future     Standing Expiration Date:   11/27/2021    Erythropoietin     Standing Status:   Future     Standing Expiration Date:   11/27/2021    Jak2 Gene Mutation, Quantitative     Standing Status:   Future     Standing Expiration Date:   11/27/2021    Path Review, Smear     Elevated hematocrit/RBCs     Standing Status:   Future     Standing Expiration Date:   11/27/2021    Reticulocytes     Standing Status:   Future     Standing Expiration Date:   11/27/2021        No follow-ups on file. Patient given educational materials - see patient instructions. Discussed use, benefit, and side effects of prescribed medications. All patient questions answered. Pt voiced understanding. Reviewed health maintenance.        Electronically signed Marylin Shannon MD on 11/27/2020 at 9:45 AM EST

## 2020-12-01 ENCOUNTER — HOSPITAL ENCOUNTER (OUTPATIENT)
Age: 35
Setting detail: SPECIMEN
Discharge: HOME | End: 2020-12-01
Payer: MEDICARE

## 2020-12-01 LAB
ABSOLUTE EOS #: 0.42 K/UL (ref 0–0.44)
ABSOLUTE IMMATURE GRANULOCYTE: 0.03 K/UL (ref 0–0.3)
ABSOLUTE LYMPH #: 2.26 K/UL (ref 1.1–3.7)
ABSOLUTE MONO #: 0.32 K/UL (ref 0.1–1.2)
ALBUMIN SERPL-MCNC: 4.3 G/DL (ref 3.5–5.2)
ALBUMIN/GLOBULIN RATIO: 1.5 (ref 1–2.5)
ALP BLD-CCNC: 83 U/L (ref 35–104)
ALT SERPL-CCNC: 10 U/L (ref 5–33)
ANION GAP SERPL CALCULATED.3IONS-SCNC: 8 MMOL/L (ref 9–17)
AST SERPL-CCNC: 18 U/L
BASOPHILS # BLD: 1 % (ref 0–2)
BASOPHILS ABSOLUTE: 0.05 K/UL (ref 0–0.2)
BILIRUB SERPL-MCNC: 0.33 MG/DL (ref 0.3–1.2)
BUN BLDV-MCNC: 10 MG/DL (ref 6–20)
BUN/CREAT BLD: 18 (ref 9–20)
CALCIUM SERPL-MCNC: 9.1 MG/DL (ref 8.6–10.4)
CHLORIDE BLD-SCNC: 103 MMOL/L (ref 98–107)
CO2: 26 MMOL/L (ref 20–31)
CREAT SERPL-MCNC: 0.55 MG/DL (ref 0.5–0.9)
DIFFERENTIAL TYPE: ABNORMAL
EOSINOPHILS RELATIVE PERCENT: 6 % (ref 1–4)
GFR AFRICAN AMERICAN: >60 ML/MIN
GFR NON-AFRICAN AMERICAN: >60 ML/MIN
GFR SERPL CREATININE-BSD FRML MDRD: ABNORMAL ML/MIN/{1.73_M2}
GFR SERPL CREATININE-BSD FRML MDRD: ABNORMAL ML/MIN/{1.73_M2}
GLUCOSE BLD-MCNC: 89 MG/DL (ref 70–99)
HCT VFR BLD CALC: 48.8 % (ref 36.3–47.1)
HCT VFR BLD CALC: 48.8 % (ref 36.3–47.1)
HEMOGLOBIN: 15.3 G/DL (ref 11.9–15.1)
HEMOGLOBIN: 15.3 G/DL (ref 11.9–15.1)
IMMATURE GRANULOCYTES: 0 %
LYMPHOCYTES # BLD: 32 % (ref 24–43)
MCH RBC QN AUTO: 28.9 PG (ref 25.2–33.5)
MCH RBC QN AUTO: 28.9 PG (ref 25.2–33.5)
MCHC RBC AUTO-ENTMCNC: 31.4 G/DL (ref 25.2–33.5)
MCHC RBC AUTO-ENTMCNC: 31.4 G/DL (ref 25.2–33.5)
MCV RBC AUTO: 92.1 FL (ref 82.6–102.9)
MCV RBC AUTO: 92.1 FL (ref 82.6–102.9)
MONOCYTES # BLD: 5 % (ref 3–12)
NRBC AUTOMATED: 0 PER 100 WBC
NRBC AUTOMATED: 0 PER 100 WBC
PDW BLD-RTO: 13 % (ref 11.8–14.4)
PDW BLD-RTO: 13 % (ref 11.8–14.4)
PLATELET # BLD: 259 K/UL (ref 138–453)
PLATELET # BLD: 259 K/UL (ref 138–453)
PLATELET ESTIMATE: ABNORMAL
PMV BLD AUTO: 10.4 FL (ref 8.1–13.5)
PMV BLD AUTO: 10.4 FL (ref 8.1–13.5)
POTASSIUM SERPL-SCNC: 4.2 MMOL/L (ref 3.7–5.3)
RBC # BLD: 5.3 M/UL (ref 3.95–5.11)
RBC # BLD: 5.3 M/UL (ref 3.95–5.11)
RBC # BLD: ABNORMAL 10*6/UL
SEG NEUTROPHILS: 56 % (ref 36–65)
SEGMENTED NEUTROPHILS ABSOLUTE COUNT: 3.94 K/UL (ref 1.5–8.1)
SODIUM BLD-SCNC: 137 MMOL/L (ref 135–144)
TOTAL PROTEIN: 7.2 G/DL (ref 6.4–8.3)
WBC # BLD: 7 K/UL (ref 3.5–11.3)
WBC # BLD: 7 K/UL (ref 3.5–11.3)
WBC # BLD: ABNORMAL 10*3/UL

## 2020-12-01 PROCEDURE — 81270 JAK2 GENE: CPT

## 2020-12-01 PROCEDURE — 80053 COMPREHEN METABOLIC PANEL: CPT

## 2020-12-01 PROCEDURE — 85027 COMPLETE CBC AUTOMATED: CPT

## 2020-12-01 PROCEDURE — 85045 AUTOMATED RETICULOCYTE COUNT: CPT

## 2020-12-01 PROCEDURE — 36415 COLL VENOUS BLD VENIPUNCTURE: CPT

## 2020-12-01 PROCEDURE — 82668 ASSAY OF ERYTHROPOIETIN: CPT

## 2020-12-01 PROCEDURE — 85025 COMPLETE CBC W/AUTO DIFF WBC: CPT

## 2020-12-01 ASSESSMENT — ENCOUNTER SYMPTOMS
TROUBLE SWALLOWING: 0
SHORTNESS OF BREATH: 0
COUGH: 0
SORE THROAT: 0
ABDOMINAL PAIN: 0
CHEST TIGHTNESS: 0
BLOOD IN STOOL: 0
NAUSEA: 1
RHINORRHEA: 0
VOMITING: 1
SINUS PAIN: 0
DIARRHEA: 1

## 2020-12-01 NOTE — TELEPHONE ENCOUNTER
You are correct. I read the note at the top. That looks like a positive results, but the body of the text contradicts the heading. Very confusing report.   I will have to discuss this with the pathologist.

## 2020-12-02 LAB
ABSOLUTE RETIC #: 0.06 M/UL (ref 0.03–0.08)
IMMATURE RETIC FRACT: 6.3 % (ref 2.7–18.3)
RETIC %: 1 % (ref 0.5–1.9)
RETIC HEMOGLOBIN: 34.3 PG (ref 28.2–35.7)

## 2020-12-03 ENCOUNTER — OFFICE VISIT (OUTPATIENT)
Dept: SURGERY | Age: 35
End: 2020-12-03
Payer: MEDICARE

## 2020-12-03 ENCOUNTER — VIRTUAL VISIT (OUTPATIENT)
Dept: FAMILY MEDICINE CLINIC | Age: 35
End: 2020-12-03
Payer: MEDICARE

## 2020-12-03 VITALS
DIASTOLIC BLOOD PRESSURE: 88 MMHG | HEART RATE: 79 BPM | HEIGHT: 68 IN | WEIGHT: 154 LBS | BODY MASS INDEX: 23.34 KG/M2 | SYSTOLIC BLOOD PRESSURE: 121 MMHG | TEMPERATURE: 98.4 F

## 2020-12-03 LAB
PATHOLOGIST REVIEW: NORMAL
SURGICAL PATHOLOGY REPORT: NORMAL

## 2020-12-03 PROCEDURE — G8484 FLU IMMUNIZE NO ADMIN: HCPCS | Performed by: SURGERY

## 2020-12-03 PROCEDURE — 1036F TOBACCO NON-USER: CPT | Performed by: SURGERY

## 2020-12-03 PROCEDURE — 99213 OFFICE O/P EST LOW 20 MIN: CPT | Performed by: SURGERY

## 2020-12-03 PROCEDURE — 99215 OFFICE O/P EST HI 40 MIN: CPT | Performed by: INTERNAL MEDICINE

## 2020-12-03 PROCEDURE — G8427 DOCREV CUR MEDS BY ELIG CLIN: HCPCS | Performed by: SURGERY

## 2020-12-03 PROCEDURE — G8420 CALC BMI NORM PARAMETERS: HCPCS | Performed by: SURGERY

## 2020-12-03 RX ORDER — DIPHENHYDRAMINE HCL 25 MG
50 TABLET ORAL ONCE
Qty: 2 TABLET | Refills: 0 | Status: SHIPPED | OUTPATIENT
Start: 2020-12-03 | End: 2020-12-03

## 2020-12-03 RX ORDER — PREDNISONE 50 MG/1
50 TABLET ORAL EVERY 6 HOURS
Qty: 3 TABLET | Refills: 0 | Status: SHIPPED | OUTPATIENT
Start: 2020-12-03 | End: 2020-12-04

## 2020-12-05 LAB — ERYTHROPOIETIN: 5 MU/ML (ref 4–27)

## 2020-12-07 ENCOUNTER — TELEPHONE (OUTPATIENT)
Dept: SURGERY | Age: 35
End: 2020-12-07

## 2020-12-07 ASSESSMENT — ENCOUNTER SYMPTOMS
SORE THROAT: 0
ABDOMINAL PAIN: 1
SINUS PAIN: 0
NAUSEA: 1
COUGH: 0
BLOOD IN STOOL: 0
CHEST TIGHTNESS: 0
CONSTIPATION: 1
SHORTNESS OF BREATH: 0
RHINORRHEA: 0
TROUBLE SWALLOWING: 0
DIARRHEA: 1

## 2020-12-07 NOTE — PROGRESS NOTES
[address]  [address]  Dept: [phone]  Dept Fax: [phone]: [phone]     Visit Date:  12/3/2020    Patient:  Cisco Mak  YOB: 1985    HPI:   Cisco Mak presents today for   Chief Complaint   Patient presents with    Other     patient is being seen today to discuss results. .    AUDIO/VIDEO CALL DUE TO COVID 23   HPI 62-year-old female with a history of unintentional weight loss of over 50 pounds within the past several months, history of H. pylori, history of C. difficile in the past is calling regarding recent results for H. pylori/pathology results. GIM(Gastric Intestinal Metaplasia)-Recent pathology results show gastric mucosal shows intestinal metaplasia which is a precursor lesion for gastric cancer. She just had an appointment with her surgeon and they are ordering some CAT scans of her belly as well as CT angiogram of the belly given her ongoing symptoms of altered bowel habits dyspepsia-like symptoms bloating belching unintentional 50 pound weight loss decreased appetite. H.pylori test was negative. .        From prior notes:  Patient reports in the past 3 to 4 weeks she has lost more than 50 pounds and currently weighs 142 pounds. She has decreased appetite the only thing she is able to keep down his water but milk pop soda is anything makes her stomach symptoms worsen she feels like puking all over. Last year she reports she had a diagnosis of H. pylori and did receive the treatment but there was no follow-up test that was done for eradication testing. SHe works in a nursing home and last time she was also diagnosed with C. difficile. work-up last year with endoscopy and colonoscopy and that is when they discovered H. pylori and she reports partially taking the treatments for less than 14 days as those antibiotics were making her sicker.   She reports she was never tested again for eradication of H. pylori. She was also scheduled for a swallow study/possible gastroparesis study or barium swallow around  that got canceled because of COVID-19 related pandemic. Report having a family history where her dad got diagnosed at a very young age of stomach cancer. Also does marijuana on intermittent basis 3 times per week as that is the only thing that has been helping her to keep food down and improve appetite.       Does report having belching bloating burping and feeling excessively hot and cold sometimes with a sensation of as if she swallowed a mint. She has never had a normal normal bowel movements in September it is always more watery not firm more creamy. Feels like her stomach is upset and she is starving to death. She does report having periodic bloody stools and black stools and does report having history of hemorrhoids. She denies overuse of NSAIDs. RePorts having night sweats almost feels like she is having hot flashes as if she is going through menopause but denies any skipping of her periods. Denies Any lumps or bumps around her body. Having history of colonic polyps that was removed. Feel lightheaded sometimes. From the depression screen and she scored high close to 19 not on meds and not homicidal or suicidal. Smoking history 1 ppd lasting x 1 week since 19 years and no alcohol.        Medications  Prior to Visit Medications    Not on File        Allergies:  is allergic to latex and iodides. Past Medical History:   has a past medical history of History of  section, MTHFR deficiency complicating pregnancy ([facility] [address] Pregnancy complicated by previous recurrent miscarriages, and Unspecified diseases of blood and blood-forming organs. Past Surgical History   has a past surgical history that includes hernia repair;  section (N/A, ,); and IR ANGIOPLASTY ILIAC INIT VESSEL W STENT (2018?).     Family History  family history includes Cancer in her father and paternal grandmother; Diabetes in her paternal grandmother; Heart Disease in her father; High Blood Pressure in her father and paternal grandmother; Mental Illness in her maternal grandmother; [place] / [place] in her mother. Social History   reports that she has quit smoking. Her smoking use included cigarettes. She quit after 5.00 years of use. She has never used smokeless tobacco. She reports current drug use. Drug: Marijuana. She reports that she does not drink alcohol. Health Maintenance:    Health Maintenance   Topic Date Due    Varicella vaccine (1 of 2 - 2-dose childhood series) 01/09/1987    HIV screen  01/09/2001    DTaP/Tdap/Td vaccine (1 - Tdap) 01/09/2005    Cervical cancer screen  01/09/2007    Flu vaccine (1) 09/01/2020    Hepatitis A vaccine  Aged Out    Hepatitis B vaccine  Aged Out    Hib vaccine  Aged Out    Meningococcal (ACWY) vaccine  Aged Out    Pneumococcal 0-64 years Vaccine  Aged Out       Subjective:      Review of Systems   Constitutional: Negative for fatigue, fever and unexpected weight change. HENT: Negative for ear pain, postnasal drip, rhinorrhea, sinus pain, sore throat and trouble swallowing. Eyes: Negative for visual disturbance. Respiratory: Negative for cough, chest tightness and shortness of breath. Cardiovascular: Negative for chest pain and leg swelling. Gastrointestinal: Positive for abdominal pain, constipation, diarrhea and nausea. Negative for blood in stool. Decreased appetite  Early satiety  Bloating      Endocrine: Negative for polyuria. Genitourinary: Negative for difficulty urinating and flank pain. Musculoskeletal: Negative for arthralgias, joint swelling and myalgias. Skin: Negative for rash. Allergic/Immunologic: Negative for environmental allergies. Neurological: Negative for weakness, light-headedness, numbness and headaches.    Hematological: Negative mins    Services were provided through a video synchronous discussion virtually to substitute for in-person clinic visit. Patient and provider were located at their individual homes. --Rosa Craig MD on 12/7/2020 at 2:26 PM    An electronic signature was used to authenticate this note. No orders of the defined types were placed in this encounter. No follow-ups on file. Patient given educational materials - see patient instructions. Discussed use, benefit, and side effects of prescribed medications. All patient questions answered. Pt voiced understanding. Reviewed health maintenance.        Electronically signed Joel Chandler MD on 12/3/2020 at 12:59 PM EST

## 2020-12-13 LAB — V617F MUTATION, QNT: 0 %

## 2020-12-16 ENCOUNTER — TELEPHONE (OUTPATIENT)
Dept: GENERAL RADIOLOGY | Age: 35
End: 2020-12-16

## 2020-12-17 ENCOUNTER — TELEPHONE (OUTPATIENT)
Dept: GENERAL RADIOLOGY | Age: 35
End: 2020-12-17

## 2021-03-02 ENCOUNTER — VIRTUAL VISIT (OUTPATIENT)
Dept: FAMILY MEDICINE CLINIC | Age: 36
End: 2021-03-02
Payer: MEDICARE

## 2021-03-02 DIAGNOSIS — R43.0 LOSS OF SMELL: ICD-10-CM

## 2021-03-02 DIAGNOSIS — U07.1 COVID-19: Primary | ICD-10-CM

## 2021-03-02 DIAGNOSIS — J34.89 RHINORRHEA: ICD-10-CM

## 2021-03-02 DIAGNOSIS — R52 GENERALIZED BODY ACHES: ICD-10-CM

## 2021-03-02 DIAGNOSIS — R43.2 LOSS OF TASTE: ICD-10-CM

## 2021-03-02 DIAGNOSIS — R50.9 FEVER, UNSPECIFIED FEVER CAUSE: ICD-10-CM

## 2021-03-02 PROCEDURE — 99211 OFF/OP EST MAY X REQ PHY/QHP: CPT

## 2021-03-02 PROCEDURE — 99213 OFFICE O/P EST LOW 20 MIN: CPT | Performed by: INTERNAL MEDICINE

## 2021-03-02 NOTE — PROGRESS NOTES
Pattie Jennings (:  1985) is a 29 y.o. female,Established patient, here for evaluation of the following chief complaint(s): Concern For COVID-19 (patient has lost her sense of taste and smell, she has vomiting, diarrhea and now a slight cough. )      ASSESSMENT/PLAN:  1. COVID-19  -     COVID-19; Future  2. Fever, unspecified fever cause  -     COVID-19; Future  3. Rhinorrhea  -     COVID-19; Future  4. Loss of taste  -     COVID-19; Future  5. Loss of smell  -     COVID-19; Future  6. Generalized body aches  -     COVID-19; Future      No follow-ups on file. SUBJECTIVE/OBJECTIVE:  HPI 45-year-old female is calling regarding concerns for possible COVID-19 related infection. Patient reports she was at a [employer] this past weekend and her symptoms started last Wednesday with cold and upper respiratory-like symptoms runny nose fever cold sweats diffuse body aches and she recently lost her taste of smell and taste. No COVID-19 related exposures no coughing no difficulty breathing no chest tightness wheezing shortness of breath. Review of Systems   Constitutional: Positive for fatigue and fever. Negative for unexpected weight change. HENT: Positive for congestion and rhinorrhea. Negative for ear pain, postnasal drip, sinus pain, sore throat and trouble swallowing. Eyes: Negative for visual disturbance. Respiratory: Negative for cough, chest tightness and shortness of breath. Cardiovascular: Negative for chest pain and leg swelling. Gastrointestinal: Negative for abdominal pain, blood in stool and diarrhea. Endocrine: Negative for polyuria. Genitourinary: Negative for difficulty urinating and flank pain. Musculoskeletal: Negative for arthralgias, joint swelling and myalgias. Skin: Negative for rash. Allergic/Immunologic: Negative for environmental allergies. Neurological: Positive for headaches. Negative for weakness, light-headedness and numbness. Hematological: Negative for adenopathy. Psychiatric/Behavioral: Negative for behavioral problems and suicidal ideas. The patient is not nervous/anxious. Patient-Reported Vitals 12/3/2020   Patient-Reported Weight 152   Patient-Reported Height 5'8        Physical Exam    [INSTRUCTIONS:  \"[x]\" Indicates a positive item  \"[]\" Indicates a negative item  -- DELETE ALL ITEMS NOT EXAMINED]    Constitutional: [x] Appears well-developed and well-nourished [x] No apparent distress      [] Abnormal -     Mental status: [x] Alert and awake  [x] Oriented to person/place/time [x] Able to follow commands    [] Abnormal -     Eyes:   EOM    [x]  Normal    [] Abnormal -   Sclera  [x]  Normal    [] Abnormal -          Discharge [x]  None visible   [] Abnormal -     HENT: [x] Normocephalic, atraumatic  [] Abnormal -   [x] Mouth/Throat: Mucous membranes are moist    External Ears [x] Normal  [] Abnormal -    Neck: [x] No visualized mass [] Abnormal -     Pulmonary/Chest: [x] Respiratory effort normal   [x] No visualized signs of difficulty breathing or respiratory distress        [] Abnormal -      Musculoskeletal:   [x] Normal gait with no signs of ataxia         [x] Normal range of motion of neck        [] Abnormal -     Neurological:        [x] No Facial Asymmetry (Cranial nerve 7 motor function) (limited exam due to video visit)          [x] No gaze palsy        [] Abnormal -          Skin:        [x] No significant exanthematous lesions or discoloration noted on facial skin         [] Abnormal -            Psychiatric:       [x] Normal Affect [] Abnormal -        [x] No Hallucinations    Other pertinent observable physical exam findings:-          On this date 3/2/2021 I have spent 30 minutes reviewing previous notes, test results and face to face (virtual) with the patient discussing the diagnosis and importance of compliance with the treatment plan as well as documenting on the day of the visit.     A:P COVID 19 test ordered. Steps to help prevent the spread of COVID-19 if you are sick  SOURCE - https://edwards-hodge.info/. html     Stay home except to get medical care   ; Stay home: People who are mildly ill with COVID-19 are able to isolate at home during their illness. You should restrict activities outside your home, except for getting medical care.   ; Avoid public areas: Do not go to work, school, or public areas.   ; Avoid public transportation: Avoid using public transportation, ride-sharing, or taxis.  ; Separate yourself from other people and animals in your home   ; Stay away from others: As much as possible, you should stay in a specific room and away from other people in your home. Also, you should use a separate bathroom, if available.   ; Limit contact with pets & animals: You should restrict contact with pets and other animals while you are sick with COVID-19, just like you would around other people. Although there have not been reports of pets or other animals becoming sick with COVID-19, it is still recommended that people sick with COVID-19 limit contact with animals until more information is known about the virus. ; When possible, have another member of your household care for your animals while you are sick. If you are sick with COVID-19, avoid contact with your pet, including petting, snuggling, being kissed or licked, and sharing food. If you must care for your pet or be around animals while you are sick, wash your hands before and after you interact with pets and wear a facemask. See COVID-19 and Animals for more information. Other considerations  ? The ill person should eat/be fed in their room if possible. Non-disposable  items used should be handled with gloves and washed with hot water or in a . Clean hands after handling used  items. ? If possible, dedicate a lined trash can for the ill person. Use gloves when removing garbage bags, handling, and disposing of trash. Wash hands after handling or disposing of trash. ? Consider consulting with your local health department about trash disposal guidance if available. Information for Household Members and Caregivers of Someone who is Sick   Call ahead before visiting your doctor   Call ahead: If you have a medical appointment, call the healthcare provider and tell them that you have or may have COVID-19. This will help the healthcare provider's office take steps to keep other people from getting infected or exposed. Wear a facemask if you are sick   ; If you are sick: You should wear a facemask when you are around other people (e.g., sharing a room or vehicle) or pets and before you enter a healthcare provider's office. ; If you are caring for others: If the person who is sick is not able to wear a facemask (for example, because it causes trouble breathing), then people who live with the person who is sick should not stay in the same room with them, or they should wear a facemask if they enter a room with the person who is sick. Cover your coughs and sneezes   ; Cover: Cover your mouth and nose with a tissue when you cough or sneeze.   ; Dispose: Throw used tissues in a lined trash can.   ; Wash hands: Immediately wash your hands with soap and water for at least 20 seconds or, if soap and water are not available, clean your hands with an alcohol-based hand  that contains at least 60% alcohol. Clean your hands often   ; Wash hands: Wash your hands often with soap and water for at least 20 seconds, especially after blowing your nose, coughing, or sneezing; going to the bathroom; and before eating or preparing food. ; Hand : If soap and water are not readily available, use an alcohol-based hand  with at least 60% alcohol, covering all surfaces of your hands and rubbing them together until they feel dry.   ; Soap and water: Soap and water are the best option if hands are visibly dirty.   ; Avoid touching: Avoid touching your eyes, nose, and mouth with unwashed hands. Handwashing Tips   ; Wet your hands with clean, running water (warm or cold), turn off the tap, and apply soap.  ; Lather your hands by rubbing them together with the soap. Lather the backs of your hands, between your fingers, and under your nails. ; Scrub your hands for at least 20 seconds. Need a timer? Hum the [place] from beginning to end twice.  ; Rinse your hands well under clean, running water.  ; Dry your hands using a clean towel or air dry them. Avoid sharing personal household items   ; Do not share: You should not share dishes, drinking glasses, cups, eating utensils, towels, or bedding with other people or pets in your home.   ; Wash thoroughly after use: After using these items, they should be washed thoroughly with soap and water. Clean all high-touch surfaces everyday   ; Clean and disinfect: Practice routine cleaning of high touch surfaces.  ; High touch surfaces include counters, tabletops, doorknobs, bathroom fixtures, toilets, phones, keyboards, tablets, and bedside tables.  ; Disinfect areas with bodily fluids: Also, clean any surfaces that may have blood, stool, or body fluids on them.   ; Household : Use a household cleaning spray or wipe, according to the label instructions. Labels contain instructions for safe and effective use of the cleaning product including precautions you should take when applying the product, such as wearing gloves and making sure you have good ventilation during use of the product.     Monitor your symptoms Seek medical attention: Seek prompt medical attention if your illness is worsening     (e.g., difficulty breathing).   ; Call your doctor: Before seeking care, call your healthcare provider and tell them that you have, or are being evaluated for, COVID-19.   ; Wear a facemask when sick: Put on a facemask before you enter the facility. These steps will help the healthcare provider's office to keep other people in the office or waiting room from getting infected or exposed. ; Alert health department: Ask your healthcare provider to call the local or Critical access hospital health department. Persons who are placed under active monitoring or facilitated self-monitoring should follow instructions provided by their local health department or occupational health professionals, as appropriate.  ; Call 911 if you have a medical emergency: If you have a medical emergency and need to call 911, notify the dispatch personnel that you have, or are being evaluated for COVID-19. If possible, put on a facemask before emergency medical services arrive. Manpreet Og, was evaluated through a synchronous (real-time) audio-video encounter. The patient (or guardian if applicable) is aware that this is a billable service. Verbal consent to proceed has been obtained within the past 12 months. The visit was conducted pursuant to the emergency declaration under the [address] [place] [address] authority and the [employer] Act. Patient identification was verified, and a caregiver was present when appropriate. The patient was located in a state where the provider was credentialed to provide care. An electronic signature was used to authenticate this note.     --Suzzane Riedel, MD       Electronically signed Rosio Porras MD on 3/2/2021 at 2:16 PM EST

## 2021-03-04 ENCOUNTER — HOSPITAL ENCOUNTER (OUTPATIENT)
Age: 36
Setting detail: SPECIMEN
Discharge: HOME | End: 2021-03-04
Payer: MEDICARE

## 2021-03-04 ENCOUNTER — NURSE ONLY (OUTPATIENT)
Dept: FAMILY MEDICINE CLINIC | Age: 36
End: 2021-03-04
Payer: MEDICARE

## 2021-03-04 DIAGNOSIS — R43.0 LOSS OF SMELL: ICD-10-CM

## 2021-03-04 DIAGNOSIS — J34.89 RHINORRHEA: ICD-10-CM

## 2021-03-04 DIAGNOSIS — R52 GENERALIZED BODY ACHES: ICD-10-CM

## 2021-03-04 DIAGNOSIS — R43.2 LOSS OF TASTE: ICD-10-CM

## 2021-03-04 DIAGNOSIS — U07.1 COVID-19: ICD-10-CM

## 2021-03-04 DIAGNOSIS — Z20.822 ENCOUNTER FOR LABORATORY TESTING FOR COVID-19 VIRUS: ICD-10-CM

## 2021-03-04 DIAGNOSIS — R50.9 FEVER, UNSPECIFIED FEVER CAUSE: ICD-10-CM

## 2021-03-04 PROCEDURE — 99211 OFF/OP EST MAY X REQ PHY/QHP: CPT | Performed by: INTERNAL MEDICINE

## 2021-03-04 PROCEDURE — U0003 INFECTIOUS AGENT DETECTION BY NUCLEIC ACID (DNA OR RNA); SEVERE ACUTE RESPIRATORY SYNDROME CORONAVIRUS 2 (SARS-COV-2) (CORONAVIRUS DISEASE [COVID-19]), AMPLIFIED PROBE TECHNIQUE, MAKING USE OF HIGH THROUGHPUT TECHNOLOGIES AS DESCRIBED BY CMS-2020-01-R: HCPCS

## 2021-03-04 PROCEDURE — U0005 INFEC AGEN DETEC AMPLI PROBE: HCPCS

## 2021-03-04 ASSESSMENT — ENCOUNTER SYMPTOMS
SORE THROAT: 0
TROUBLE SWALLOWING: 0
BLOOD IN STOOL: 0
COUGH: 0
CHEST TIGHTNESS: 0
SINUS PAIN: 0
DIARRHEA: 0
RHINORRHEA: 1
ABDOMINAL PAIN: 0
SHORTNESS OF BREATH: 0

## 2021-03-06 LAB
SARS-COV-2: NORMAL
SARS-COV-2: NOT DETECTED
SOURCE: NORMAL

## 2021-03-07 ENCOUNTER — TELEPHONE (OUTPATIENT)
Dept: PRIMARY CARE CLINIC | Age: 36
End: 2021-03-07

## 2021-05-25 ENCOUNTER — TELEPHONE (OUTPATIENT)
Dept: GENERAL RADIOLOGY | Age: 36
End: 2021-05-25

## 2021-05-28 ENCOUNTER — HOSPITAL ENCOUNTER (OUTPATIENT)
Dept: CT IMAGING | Age: 36
Discharge: HOME | End: 2021-05-30
Payer: MEDICARE

## 2021-05-28 DIAGNOSIS — R11.2 INTRACTABLE VOMITING WITH NAUSEA: ICD-10-CM

## 2021-05-28 DIAGNOSIS — K58.9 IRRITABLE BOWEL SYNDROME WITHOUT DIARRHEA: ICD-10-CM

## 2021-05-28 PROCEDURE — 2709999900 CT ENTEROGRAPHY W CONTRAST

## 2021-05-28 PROCEDURE — 2500000003 HC RX 250 WO HCPCS: Performed by: SURGERY

## 2021-05-28 PROCEDURE — 6360000004 HC RX CONTRAST MEDICATION: Performed by: SURGERY

## 2021-05-28 RX ADMIN — BARIUM SULFATE 1350 ML: 1 SUSPENSION ORAL at 08:40

## 2021-05-28 RX ADMIN — IOPAMIDOL 100 ML: 755 INJECTION, SOLUTION INTRAVENOUS at 09:47

## 2021-06-10 ENCOUNTER — TELEPHONE (OUTPATIENT)
Dept: FAMILY MEDICINE CLINIC | Age: 36
End: 2021-06-10

## 2021-06-10 DIAGNOSIS — R10.13 DYSPEPSIA: ICD-10-CM

## 2021-06-10 DIAGNOSIS — K31.A0 INTESTINAL METAPLASIA OF GASTRIC MUCOSA: ICD-10-CM

## 2021-06-10 DIAGNOSIS — R14.0 BLOATING: ICD-10-CM

## 2021-06-10 DIAGNOSIS — F12.90 MARIJUANA USER: ICD-10-CM

## 2021-06-10 DIAGNOSIS — A04.8 HELICOBACTER PYLORI (H. PYLORI) INFECTION: Primary | ICD-10-CM

## 2021-06-10 DIAGNOSIS — R63.4 UNINTENDED WEIGHT LOSS: ICD-10-CM

## 2021-06-28 ENCOUNTER — OFFICE VISIT (OUTPATIENT)
Dept: PRIMARY CARE CLINIC | Age: 36
End: 2021-06-28
Payer: MEDICARE

## 2021-06-28 VITALS
WEIGHT: 154.8 LBS | SYSTOLIC BLOOD PRESSURE: 125 MMHG | HEART RATE: 67 BPM | BODY MASS INDEX: 23.46 KG/M2 | HEIGHT: 68 IN | DIASTOLIC BLOOD PRESSURE: 70 MMHG | OXYGEN SATURATION: 99 %

## 2021-06-28 DIAGNOSIS — R10.84 GENERALIZED ABDOMINAL PAIN: Primary | ICD-10-CM

## 2021-06-28 PROCEDURE — 1036F TOBACCO NON-USER: CPT | Performed by: FAMILY MEDICINE

## 2021-06-28 PROCEDURE — G8427 DOCREV CUR MEDS BY ELIG CLIN: HCPCS | Performed by: FAMILY MEDICINE

## 2021-06-28 PROCEDURE — G8420 CALC BMI NORM PARAMETERS: HCPCS | Performed by: FAMILY MEDICINE

## 2021-06-28 PROCEDURE — 99213 OFFICE O/P EST LOW 20 MIN: CPT | Performed by: FAMILY MEDICINE

## 2021-06-28 RX ORDER — OXYBUTYNIN CHLORIDE 10 MG/1
TABLET, EXTENDED RELEASE ORAL
COMMUNITY
Start: 2021-06-24

## 2021-06-28 RX ORDER — METRONIDAZOLE 500 MG/1
TABLET ORAL
COMMUNITY
Start: 2021-06-24

## 2021-06-28 SDOH — ECONOMIC STABILITY: FOOD INSECURITY: WITHIN THE PAST 12 MONTHS, YOU WORRIED THAT YOUR FOOD WOULD RUN OUT BEFORE YOU GOT MONEY TO BUY MORE.: NEVER TRUE

## 2021-06-28 SDOH — ECONOMIC STABILITY: FOOD INSECURITY: WITHIN THE PAST 12 MONTHS, THE FOOD YOU BOUGHT JUST DIDN'T LAST AND YOU DIDN'T HAVE MONEY TO GET MORE.: NEVER TRUE

## 2021-06-28 ASSESSMENT — PATIENT HEALTH QUESTIONNAIRE - PHQ9
SUM OF ALL RESPONSES TO PHQ9 QUESTIONS 1 & 2: 0
SUM OF ALL RESPONSES TO PHQ QUESTIONS 1-9: 0
SUM OF ALL RESPONSES TO PHQ QUESTIONS 1-9: 0
1. LITTLE INTEREST OR PLEASURE IN DOING THINGS: 0
2. FEELING DOWN, DEPRESSED OR HOPELESS: 0
SUM OF ALL RESPONSES TO PHQ QUESTIONS 1-9: 0
SUM OF ALL RESPONSES TO PHQ9 QUESTIONS 1 & 2: 0
2. FEELING DOWN, DEPRESSED OR HOPELESS: 0
SUM OF ALL RESPONSES TO PHQ QUESTIONS 1-9: 0
1. LITTLE INTEREST OR PLEASURE IN DOING THINGS: 0

## 2021-06-28 ASSESSMENT — ENCOUNTER SYMPTOMS: RESPIRATORY NEGATIVE: 1

## 2021-06-28 ASSESSMENT — SOCIAL DETERMINANTS OF HEALTH (SDOH): HOW HARD IS IT FOR YOU TO PAY FOR THE VERY BASICS LIKE FOOD, HOUSING, MEDICAL CARE, AND HEATING?: NOT HARD AT ALL

## 2021-06-28 NOTE — PROGRESS NOTES
Subjective:      Patient ID: Darnell Fairbanks is a 29 y.o. female. Was told she has cancer in her stomach  She may be getting hyst      Review of Systems   Constitutional: Negative. Respiratory: Negative. Cardiovascular: Negative. Endocrine: Negative. Genitourinary: Negative. Musculoskeletal: Negative. Neurological: Negative. Psychiatric/Behavioral: Negative. Objective:   Physical Exam    Assessment:      1. Generalized abdominal pain            Plan:      Teagan Mariscal was seen today for other.     Diagnoses and all orders for this visit:    Generalized abdominal pain    tests reviewed whatever she was told is a giant overreach  She has mild meteplasia and Hpylori NEG  I will not recommend she get rescoped anytime soon  I will look at reports about the hyst to see if it is truly necessary          Electronically signed by Royal Leann MD on 6/28/2021 at 9:17 AM

## 2022-05-05 LAB
FLU A ANTIGEN: NEGATIVE
FLU B ANTIGEN: NEGATIVE
STREP A DIRECT SCREEN: NEGATIVE

## 2022-08-30 LAB — SARS-COV-2, RAPID: NEGATIVE

## 2023-07-02 ENCOUNTER — HOSPITAL ENCOUNTER (EMERGENCY)
Age: 38
Discharge: HOME | End: 2023-07-02
Attending: EMERGENCY MEDICINE
Payer: MEDICAID

## 2023-07-02 VITALS
TEMPERATURE: 97.5 F | WEIGHT: 145 LBS | DIASTOLIC BLOOD PRESSURE: 89 MMHG | HEART RATE: 73 BPM | SYSTOLIC BLOOD PRESSURE: 133 MMHG | BODY MASS INDEX: 21.98 KG/M2 | OXYGEN SATURATION: 97 % | RESPIRATION RATE: 18 BRPM | HEIGHT: 68 IN

## 2023-07-02 DIAGNOSIS — G43.909 MIGRAINE WITHOUT STATUS MIGRAINOSUS, NOT INTRACTABLE, UNSPECIFIED MIGRAINE TYPE: Primary | ICD-10-CM

## 2023-07-02 PROCEDURE — 6370000000 HC RX 637 (ALT 250 FOR IP): Performed by: EMERGENCY MEDICINE

## 2023-07-02 PROCEDURE — 6360000002 HC RX W HCPCS: Performed by: EMERGENCY MEDICINE

## 2023-07-02 PROCEDURE — 96372 THER/PROPH/DIAG INJ SC/IM: CPT

## 2023-07-02 PROCEDURE — 99284 EMERGENCY DEPT VISIT MOD MDM: CPT

## 2023-07-02 RX ORDER — METOCLOPRAMIDE HYDROCHLORIDE 5 MG/ML
10 INJECTION INTRAMUSCULAR; INTRAVENOUS ONCE
Status: COMPLETED | OUTPATIENT
Start: 2023-07-02 | End: 2023-07-02

## 2023-07-02 RX ORDER — ONDANSETRON 4 MG/1
4 TABLET, ORALLY DISINTEGRATING ORAL ONCE
Status: COMPLETED | OUTPATIENT
Start: 2023-07-02 | End: 2023-07-02

## 2023-07-02 RX ORDER — DIPHENHYDRAMINE HYDROCHLORIDE 50 MG/ML
50 INJECTION INTRAMUSCULAR; INTRAVENOUS ONCE
Status: COMPLETED | OUTPATIENT
Start: 2023-07-02 | End: 2023-07-02

## 2023-07-02 RX ORDER — KETOROLAC TROMETHAMINE 30 MG/ML
30 INJECTION, SOLUTION INTRAMUSCULAR; INTRAVENOUS ONCE
Status: COMPLETED | OUTPATIENT
Start: 2023-07-02 | End: 2023-07-02

## 2023-07-02 RX ADMIN — METOCLOPRAMIDE 10 MG: 5 INJECTION, SOLUTION INTRAMUSCULAR; INTRAVENOUS at 08:02

## 2023-07-02 RX ADMIN — KETOROLAC TROMETHAMINE 30 MG: 30 INJECTION, SOLUTION INTRAMUSCULAR; INTRAVENOUS at 08:01

## 2023-07-02 RX ADMIN — ONDANSETRON 4 MG: 4 TABLET, ORALLY DISINTEGRATING ORAL at 08:00

## 2023-07-02 RX ADMIN — DIPHENHYDRAMINE HYDROCHLORIDE 50 MG: 50 INJECTION, SOLUTION INTRAMUSCULAR; INTRAVENOUS at 08:01

## 2023-07-02 ASSESSMENT — ENCOUNTER SYMPTOMS
EYE PAIN: 0
COUGH: 0
NAUSEA: 0
PHOTOPHOBIA: 0
SORE THROAT: 0
ABDOMINAL PAIN: 0
BACK PAIN: 0
RHINORRHEA: 0
SHORTNESS OF BREATH: 0
VOMITING: 0
DIARRHEA: 0

## 2023-07-02 ASSESSMENT — PAIN SCALES - GENERAL: PAINLEVEL_OUTOF10: 7

## 2023-07-02 ASSESSMENT — PAIN - FUNCTIONAL ASSESSMENT: PAIN_FUNCTIONAL_ASSESSMENT: 0-10
